# Patient Record
Sex: MALE | Race: WHITE | Employment: PART TIME | URBAN - METROPOLITAN AREA
[De-identification: names, ages, dates, MRNs, and addresses within clinical notes are randomized per-mention and may not be internally consistent; named-entity substitution may affect disease eponyms.]

---

## 2017-05-10 PROBLEM — K56.609 SBO (SMALL BOWEL OBSTRUCTION) (HCC): Status: ACTIVE | Noted: 2017-01-01

## 2017-05-10 PROBLEM — K59.00 CONSTIPATION: Status: ACTIVE | Noted: 2017-01-01

## 2017-06-06 PROBLEM — C15.8 MALIGNANT NEOPLASM OF OVERLAPPING SITES OF ESOPHAGUS (HCC): Status: ACTIVE | Noted: 2017-01-01

## 2018-01-01 ENCOUNTER — OFFICE VISIT (OUTPATIENT)
Dept: ONCOLOGY | Age: 53
End: 2018-01-01
Payer: MEDICARE

## 2018-01-01 ENCOUNTER — HOSPITAL ENCOUNTER (OUTPATIENT)
Dept: INFUSION THERAPY | Age: 53
End: 2018-01-01
Payer: MEDICARE

## 2018-01-01 ENCOUNTER — HOSPITAL ENCOUNTER (OUTPATIENT)
Dept: INFUSION THERAPY | Age: 53
Discharge: HOME | End: 2018-03-23
Payer: MEDICARE

## 2018-01-01 ENCOUNTER — CARE COORDINATION (OUTPATIENT)
Dept: CASE MANAGEMENT | Age: 53
End: 2018-01-01

## 2018-01-01 VITALS
HEART RATE: 98 BPM | DIASTOLIC BLOOD PRESSURE: 70 MMHG | RESPIRATION RATE: 20 BRPM | TEMPERATURE: 97.7 F | SYSTOLIC BLOOD PRESSURE: 119 MMHG

## 2018-01-01 DIAGNOSIS — C15.5 MALIGNANT NEOPLASM OF LOWER THIRD OF ESOPHAGUS (HCC): Primary | ICD-10-CM

## 2018-01-01 DIAGNOSIS — C15.5 MALIGNANT NEOPLASM OF LOWER THIRD OF ESOPHAGUS (HCC): ICD-10-CM

## 2018-01-01 LAB
ALBUMIN SERPL-MCNC: 2.5 G/DL (ref 3.5–5.2)
ALP BLD-CCNC: 528 U/L (ref 40–129)
ALT SERPL-CCNC: 125 U/L (ref 0–40)
ANION GAP SERPL CALCULATED.3IONS-SCNC: 16 MMOL/L (ref 7–16)
ANISOCYTOSIS: ABNORMAL
AST SERPL-CCNC: 73 U/L (ref 0–39)
BASOPHILS ABSOLUTE: 0 E9/L (ref 0–0.2)
BASOPHILS RELATIVE PERCENT: 0.1 % (ref 0–2)
BILIRUB SERPL-MCNC: 0.3 MG/DL (ref 0–1.2)
BUN BLDV-MCNC: 60 MG/DL (ref 6–20)
CALCIUM SERPL-MCNC: 7.8 MG/DL (ref 8.6–10.2)
CHLORIDE BLD-SCNC: 96 MMOL/L (ref 98–107)
CO2: 18 MMOL/L (ref 22–29)
CREAT SERPL-MCNC: 1.6 MG/DL (ref 0.7–1.2)
EOSINOPHILS ABSOLUTE: 0 E9/L (ref 0.05–0.5)
EOSINOPHILS RELATIVE PERCENT: 0 % (ref 0–6)
GFR AFRICAN AMERICAN: 55
GFR NON-AFRICAN AMERICAN: 45 ML/MIN/1.73
GLUCOSE BLD-MCNC: 270 MG/DL (ref 74–109)
HCT VFR BLD CALC: 31 % (ref 37–54)
HEMOGLOBIN: 10 G/DL (ref 12.5–16.5)
LYMPHOCYTES ABSOLUTE: 0.17 E9/L (ref 1.5–4)
LYMPHOCYTES RELATIVE PERCENT: 2 % (ref 20–42)
MCH RBC QN AUTO: 26.7 PG (ref 26–35)
MCHC RBC AUTO-ENTMCNC: 32.3 % (ref 32–34.5)
MCV RBC AUTO: 82.7 FL (ref 80–99.9)
MONOCYTES ABSOLUTE: 0.17 E9/L (ref 0.1–0.95)
MONOCYTES RELATIVE PERCENT: 2 % (ref 2–12)
NEUTROPHILS ABSOLUTE: 8.35 E9/L (ref 1.8–7.3)
NEUTROPHILS RELATIVE PERCENT: 96 % (ref 43–80)
PDW BLD-RTO: 20.1 FL (ref 11.5–15)
PLATELET # BLD: 376 E9/L (ref 130–450)
PMV BLD AUTO: 9.6 FL (ref 7–12)
POIKILOCYTES: ABNORMAL
POTASSIUM SERPL-SCNC: 4.4 MMOL/L (ref 3.5–5)
RBC # BLD: 3.75 E12/L (ref 3.8–5.8)
SODIUM BLD-SCNC: 130 MMOL/L (ref 132–146)
TARGET CELLS: ABNORMAL
TOTAL PROTEIN: 5.3 G/DL (ref 6.4–8.3)
WBC # BLD: 8.7 E9/L (ref 4.5–11.5)

## 2018-01-01 PROCEDURE — G8420 CALC BMI NORM PARAMETERS: HCPCS | Performed by: INTERNAL MEDICINE

## 2018-01-01 PROCEDURE — 99214 OFFICE O/P EST MOD 30 MIN: CPT | Performed by: INTERNAL MEDICINE

## 2018-01-01 PROCEDURE — 85025 COMPLETE CBC W/AUTO DIFF WBC: CPT

## 2018-01-01 PROCEDURE — 99214 OFFICE O/P EST MOD 30 MIN: CPT

## 2018-01-01 PROCEDURE — 1036F TOBACCO NON-USER: CPT | Performed by: INTERNAL MEDICINE

## 2018-01-01 PROCEDURE — 1111F DSCHRG MED/CURRENT MED MERGE: CPT | Performed by: INTERNAL MEDICINE

## 2018-01-01 PROCEDURE — 80053 COMPREHEN METABOLIC PANEL: CPT

## 2018-01-01 PROCEDURE — G8427 DOCREV CUR MEDS BY ELIG CLIN: HCPCS | Performed by: INTERNAL MEDICINE

## 2018-01-01 PROCEDURE — 3017F COLORECTAL CA SCREEN DOC REV: CPT | Performed by: INTERNAL MEDICINE

## 2018-01-01 PROCEDURE — G8484 FLU IMMUNIZE NO ADMIN: HCPCS | Performed by: INTERNAL MEDICINE

## 2018-02-25 PROBLEM — J18.9 PNEUMONIA: Status: ACTIVE | Noted: 2018-01-01

## 2018-02-25 PROBLEM — J18.9 HEALTHCARE-ASSOCIATED PNEUMONIA: Status: ACTIVE | Noted: 2018-01-01

## 2018-02-26 PROBLEM — E43 SEVERE PROTEIN-CALORIE MALNUTRITION (HCC): Chronic | Status: ACTIVE | Noted: 2018-01-01

## 2018-03-02 NOTE — CARE COORDINATION
[facility] Transitions Initial Follow Up Call    Call within 2 business days of discharge: Yes    Patient: Julia Claudio Patient : 1965   MRN: <[id number]>  Reason for Admission: There are no discharge diagnoses documented for the most recent discharge. Discharge Date: 3/1/18 RARS: [facility] Risk Score: 22.75     First attempt to reach pt for the care transition intial follow up call. Left message to call transition care coordinator from [facility] MATTI MACIAS @ [phone].     Follow Up  Future Appointments  Date Time Provider Rolando Villatoro   3/6/2018 9:00 AM Makayla Hawkins PA-C [facility]       Merari Fry RN  Care Transition Coordinator  [phone]

## 2018-03-23 NOTE — PROGRESS NOTES
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 5 mm and 2 mm nodules in the right lung, similar appearance    prior examination. 2. Large mass at the gastroesophageal junction is suspicious for    neoplasm. 3. Otherwise, no acute process in the thorax. CT scan abdomen/pelvis on 10/12/2015:  1. Large mass in the gastroesophageal junction infiltrates the    proximal stomach. 2. Otherwise, no acute process in the abdomen. 3. No acute process in the pelvis. PET/CT scan on 10/22/2015:  1. FDG uptake in the supraglottic pharynx oropharynx as described   above likely physiologic in nature or related to physiologic   motion or inflammatory process. Given the history of esophageal   carcinoma direct visualization is recommended. No discrete CT   correlate to suggest mass or lymphadenopathy. 2. Distal esophageal/gastroesophageal junction mass with FDG   avidity compatible with known neoplasm. No other abnormal FDG   uptake in the mediastinum or thorax is metastatic disease. 3. No abnormal FDG uptake to suggest FDG G avid neoplasm in the   abdomen, pelvis, or skeleton. EUS on 11/13/2015:  - Partially obstructing, malignant esophageal tumor was found in the lower third of the esophagus. Biopsy Poorly differentiated adenocarcinoma with mucinous and signet ring cell features, at least intramucosal.  - Gastric tumor in the cardia.  - No gross lesions in duodenum.  - A 11cm mass was found in the lower third of the esophagus. This was staged T3 N2 Mx by endosonographic criteria. 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    RT was started on Brenda Lopez was on 11/21/2017. Cycle # 2 Keytruda was on 12/12/2017. Cycle # 3 Keytruda was on 01/09/2018. Cycle # 4 Keytruda was on 01/30/2018. Admitted to [facility] on 02/25/2018 for HCAP with negative cultures. Discharged on 02/28/2018 on Levaquin. Significant clinical deterioration in the last 3-4 weeks. Review of Systems;  CONSTITUTIONAL: No fever, chills. Poor appetite and energy level. Weight loss  ENMT: Eyes: No diplopia; Nose: No epistaxis. Mouth: No sore throat. RESPIRATORY: No hemoptysis. Positive for coughing. CARDIOVASCULAR: No chest pain, palpitations. GASTROINTESTINAL: Positive for intermittent nausea/vomiting, abdominal pain  GENITOURINARY: No dysuria, urinary frequency, hematuria. NEURO: weakness  REMAINDER ROS: Negative. Past Medical History:      Diagnosis Date    Acute renal insufficiency 1/16/2013    Anemia     Bicipital tendinitis of left shoulder 5/12/2014    Bleeding ulcer     Constipation     Diabetes mellitus ([address]     Esophageal cancer (HCC)     GERD (gastroesophageal reflux disease) 10/12/2015    History of blood transfusion     multiple    Hyperlipidemia     Hypertension 5/12/2014    Hypoglycemia     Major depressive disorder     Malnutrition (HCC)     Peptic ulcer     Pneumonia     Pneumonia      Medications:  Reviewed and reconciled. Allergies: Allergies   Allergen Reactions    Bee Venom Hives     Got stung by one bee 2-3 times (believes was a yellow jacket) and got hives. States first noted summer of 2013 went to urgent care for treatment. Physical Exam:  /70 (Site: Right Arm, Position: Sitting, Cuff Size: Small Adult)   Pulse 98   Temp 97.7 °F (36.5 °C) (Temporal)   Resp 20   GENERAL: Alert, oriented x 3, tired. Wheelchair bound. HEENT: PERRLA; EOMI. No oral thrush. NECK: Supple. Without lymphadenopathy. LUNGS: bilateral basal crackles. CARDIOVASCULAR: RRR. No murmurs, rubs or gallops. ABDOMEN:  Soft, NT, ND + BS.  Ileostomy bag

## 2018-03-23 NOTE — PROGRESS NOTES
Patient reports that he has signed up for hospice today but is willing to have one more treatment today before quitting. He says he is content with stopping his treatments and has started to make the appropriate arrangements with his wife and children. Patient feels as though his body is \"shutting down\" and would like to see if his tumor(s) have decreased in size.